# Patient Record
Sex: MALE | Race: WHITE | NOT HISPANIC OR LATINO
[De-identification: names, ages, dates, MRNs, and addresses within clinical notes are randomized per-mention and may not be internally consistent; named-entity substitution may affect disease eponyms.]

---

## 2020-07-30 ENCOUNTER — ERX REFILL RESPONSE (OUTPATIENT)
Age: 75
End: 2020-07-30

## 2020-07-30 RX ORDER — PANTOPRAZOLE SODIUM 40 MG/1
TAKE ONE TABLET BY MOUTH TWICE A DAY TABLET, DELAYED RELEASE ORAL
Qty: 178 | Refills: 2

## 2023-03-20 ENCOUNTER — DASHBOARD ENCOUNTERS (OUTPATIENT)
Age: 78
End: 2023-03-20

## 2023-03-20 ENCOUNTER — OFFICE VISIT (OUTPATIENT)
Dept: URBAN - NONMETROPOLITAN AREA CLINIC 13 | Facility: CLINIC | Age: 78
End: 2023-03-20
Payer: COMMERCIAL

## 2023-03-20 ENCOUNTER — LAB OUTSIDE AN ENCOUNTER (OUTPATIENT)
Dept: URBAN - NONMETROPOLITAN AREA CLINIC 13 | Facility: CLINIC | Age: 78
End: 2023-03-20

## 2023-03-20 VITALS
DIASTOLIC BLOOD PRESSURE: 60 MMHG | HEART RATE: 93 BPM | HEIGHT: 68 IN | WEIGHT: 144.6 LBS | SYSTOLIC BLOOD PRESSURE: 92 MMHG | BODY MASS INDEX: 21.92 KG/M2

## 2023-03-20 DIAGNOSIS — K21.9 GERD WITHOUT ESOPHAGITIS: ICD-10-CM

## 2023-03-20 DIAGNOSIS — R13.10 DYSPHAGIA: ICD-10-CM

## 2023-03-20 PROBLEM — 266435005: Status: ACTIVE | Noted: 2023-03-20

## 2023-03-20 PROCEDURE — 99214 OFFICE O/P EST MOD 30 MIN: CPT | Performed by: NURSE PRACTITIONER

## 2023-03-20 RX ORDER — TRAMADOL HYDROCHLORIDE EXTENDED-RELEASE TABLETS 300 MG/1
TAKE 1 TABLET (300 MG) BY ORAL ROUTE ONCE DAILY TABLET, FILM COATED, EXTENDED RELEASE ORAL 1
Qty: 0 | Refills: 0 | Status: ACTIVE | COMMUNITY
Start: 1900-01-01 | End: 1900-01-01

## 2023-03-20 RX ORDER — CITALOPRAM 40 MG/1
TAKE 1 TABLET (40 MG) BY ORAL ROUTE ONCE DAILY TABLET ORAL 1
Qty: 0 | Refills: 0 | Status: ACTIVE | COMMUNITY
Start: 1900-01-01 | End: 1900-01-01

## 2023-03-20 RX ORDER — AMLODIPINE BESYLATE 5 MG/1
TAKE 1 TABLET (5 MG) BY ORAL ROUTE ONCE DAILY TABLET ORAL 1
Qty: 0 | Refills: 0 | Status: ACTIVE | COMMUNITY
Start: 1900-01-01 | End: 1900-01-01

## 2023-03-20 RX ORDER — PANTOPRAZOLE SODIUM 40 MG/1
TAKE ONE TABLET BY MOUTH TWICE A DAY TABLET, DELAYED RELEASE ORAL
Qty: 178 | Refills: 2 | Status: ACTIVE | COMMUNITY

## 2023-03-20 RX ORDER — HYDROCODONE BITARTRATE AND ACETAMINOPHEN 10; 325 MG/1; MG/1
TAKE 1 TABLET BY ORAL ROUTE EVERY 6 HOURS AS NEEDED FOR PAIN TABLET ORAL
Qty: 0 | Refills: 0 | Status: ACTIVE | COMMUNITY
Start: 1900-01-01 | End: 1900-01-01

## 2023-03-20 RX ORDER — DICYCLOMINE HYDROCHLORIDE 10 MG/1
TAKE 1 CAPSULE (10 MG) BY ORAL ROUTE 3 TIMES PER DAY CAPSULE ORAL
Qty: 0 | Refills: 0 | Status: ACTIVE | COMMUNITY
Start: 1900-01-01 | End: 1900-01-01

## 2023-03-20 RX ORDER — AMITRIPTYLINE HYDROCHLORIDE 150 MG/1
TAKE 1 TABLET (150 MG) BY ORAL ROUTE ONCE DAILY AT BEDTIME TABLET, FILM COATED ORAL 1
Qty: 0 | Refills: 0 | Status: ACTIVE | COMMUNITY
Start: 1900-01-01 | End: 1900-01-01

## 2023-03-20 RX ORDER — ZOLPIDEM TARTRATE 10 MG/1
TABLET, FILM COATED ORAL
Qty: 0 | Refills: 0 | Status: ACTIVE | COMMUNITY
Start: 1900-01-01 | End: 1900-01-01

## 2023-03-20 RX ORDER — BIOTIN 2500 MCG
CAPSULE ORAL
Qty: 0 | Refills: 0 | Status: ACTIVE | COMMUNITY
Start: 1900-01-01 | End: 1900-01-01

## 2023-03-20 NOTE — HPI-OTHER HISTORIES
2019 Mr. Pilo Kan is here for f/u after dilatation. He has had multiple dilatations for esophageal stricture and severe esophagitis. His last dilatation was his best. He was able to be dilated to 45. He has been able to eat full meals. He has noticed that he is starting to regurgitate some. He has gained about 10 pounds. He is on carafate BID and nexium 40mg BID. CS   10/2/96398  Recent EGD with dilation using a 48French dilator was performed this summer. HE had gastritis as well without H pylori. Today he feels his swallowing is better than its ever been. He has not had any dysphagia at all. He is on Zantac and is asking about the recent warnings about this. His weight and appetite are stable

## 2023-03-20 NOTE — HPI-TODAY'S VISIT:
3/20/2023 Mr. Pilo Kan is a 77 year old male here for dysphagia. He was last seen in 2019. He has a hx of esophageal stenosis last dilated in 2019. He had been doing really well until the last two months. he has had the food get stuck at a [employer] and only have foam come up. He has had some reflux and pepcid helps. CS

## 2023-03-21 ENCOUNTER — OFFICE VISIT (OUTPATIENT)
Dept: URBAN - NONMETROPOLITAN AREA SURGERY CENTER 1 | Facility: SURGERY CENTER | Age: 78
End: 2023-03-21

## 2023-03-21 ENCOUNTER — CLAIMS CREATED FROM THE CLAIM WINDOW (OUTPATIENT)
Dept: URBAN - NONMETROPOLITAN AREA SURGERY CENTER 1 | Facility: SURGERY CENTER | Age: 78
End: 2023-03-21
Payer: COMMERCIAL

## 2023-03-21 ENCOUNTER — CLAIMS CREATED FROM THE CLAIM WINDOW (OUTPATIENT)
Dept: URBAN - METROPOLITAN AREA CLINIC 4 | Facility: CLINIC | Age: 78
End: 2023-03-21
Payer: COMMERCIAL

## 2023-03-21 DIAGNOSIS — K29.70 GASTRITIS, UNSPECIFIED, WITHOUT BLEEDING: ICD-10-CM

## 2023-03-21 DIAGNOSIS — K29.60 ADENOPAPILLOMATOSIS GASTRICA: ICD-10-CM

## 2023-03-21 DIAGNOSIS — K21.00 GASTRO-ESOPHAGEAL REFLUX DISEASE WITH ESOPHAGITIS, WITHOUT BLEEDING: ICD-10-CM

## 2023-03-21 DIAGNOSIS — K22.2 ACQUIRED ESOPHAGEAL RING: ICD-10-CM

## 2023-03-21 DIAGNOSIS — K21.00 ALKALINE REFLUX ESOPHAGITIS: ICD-10-CM

## 2023-03-21 PROCEDURE — 43248 EGD GUIDE WIRE INSERTION: CPT | Performed by: INTERNAL MEDICINE

## 2023-03-21 PROCEDURE — 88305 TISSUE EXAM BY PATHOLOGIST: CPT | Performed by: PATHOLOGY

## 2023-03-21 PROCEDURE — 88312 SPECIAL STAINS GROUP 1: CPT | Performed by: PATHOLOGY

## 2023-03-21 PROCEDURE — 88341 IMHCHEM/IMCYTCHM EA ADD ANTB: CPT | Performed by: PATHOLOGY

## 2023-03-21 PROCEDURE — G8907 PT DOC NO EVENTS ON DISCHARG: HCPCS | Performed by: INTERNAL MEDICINE

## 2023-03-21 PROCEDURE — 88342 IMHCHEM/IMCYTCHM 1ST ANTB: CPT | Performed by: PATHOLOGY

## 2023-03-21 PROCEDURE — 43239 EGD BIOPSY SINGLE/MULTIPLE: CPT | Performed by: INTERNAL MEDICINE

## 2023-06-28 ENCOUNTER — OFFICE VISIT (OUTPATIENT)
Dept: URBAN - NONMETROPOLITAN AREA CLINIC 13 | Facility: CLINIC | Age: 78
End: 2023-06-28